# Patient Record
Sex: MALE | Race: WHITE | NOT HISPANIC OR LATINO | URBAN - METROPOLITAN AREA
[De-identification: names, ages, dates, MRNs, and addresses within clinical notes are randomized per-mention and may not be internally consistent; named-entity substitution may affect disease eponyms.]

---

## 2019-01-08 ENCOUNTER — INPATIENT (INPATIENT)
Facility: HOSPITAL | Age: 79
LOS: 2 days | Discharge: HOME | DRG: 312 | End: 2019-01-11
Attending: INTERNAL MEDICINE | Admitting: INTERNAL MEDICINE
Payer: MEDICARE

## 2019-01-08 VITALS
HEART RATE: 66 BPM | TEMPERATURE: 98 F | DIASTOLIC BLOOD PRESSURE: 93 MMHG | RESPIRATION RATE: 18 BRPM | SYSTOLIC BLOOD PRESSURE: 174 MMHG | OXYGEN SATURATION: 100 %

## 2019-01-08 DIAGNOSIS — R55 SYNCOPE AND COLLAPSE: ICD-10-CM

## 2019-01-08 DIAGNOSIS — N18.3 CHRONIC KIDNEY DISEASE, STAGE 3 (MODERATE): ICD-10-CM

## 2019-01-08 DIAGNOSIS — H26.9 UNSPECIFIED CATARACT: Chronic | ICD-10-CM

## 2019-01-08 DIAGNOSIS — I10 ESSENTIAL (PRIMARY) HYPERTENSION: ICD-10-CM

## 2019-01-08 DIAGNOSIS — S01.01XA LACERATION WITHOUT FOREIGN BODY OF SCALP, INITIAL ENCOUNTER: ICD-10-CM

## 2019-01-08 DIAGNOSIS — Z90.79 ACQUIRED ABSENCE OF OTHER GENITAL ORGAN(S): Chronic | ICD-10-CM

## 2019-01-08 DIAGNOSIS — N31.9 NEUROMUSCULAR DYSFUNCTION OF BLADDER, UNSPECIFIED: ICD-10-CM

## 2019-01-08 DIAGNOSIS — E78.5 HYPERLIPIDEMIA, UNSPECIFIED: ICD-10-CM

## 2019-01-08 LAB
ALBUMIN SERPL ELPH-MCNC: 4.2 G/DL — SIGNIFICANT CHANGE UP (ref 3.3–5)
ALP SERPL-CCNC: 79 U/L — SIGNIFICANT CHANGE UP (ref 40–120)
ALT FLD-CCNC: 15 U/L — SIGNIFICANT CHANGE UP (ref 10–45)
ANION GAP SERPL CALC-SCNC: 13 MMOL/L — SIGNIFICANT CHANGE UP (ref 5–17)
APPEARANCE UR: CLEAR — SIGNIFICANT CHANGE UP
APTT BLD: 26.1 SEC — LOW (ref 27.5–36.3)
AST SERPL-CCNC: 19 U/L — SIGNIFICANT CHANGE UP (ref 10–40)
BACTERIA # UR AUTO: PRESENT /HPF
BASOPHILS NFR BLD AUTO: 0.3 % — SIGNIFICANT CHANGE UP (ref 0–2)
BILIRUB SERPL-MCNC: 0.6 MG/DL — SIGNIFICANT CHANGE UP (ref 0.2–1.2)
BILIRUB UR-MCNC: NEGATIVE — SIGNIFICANT CHANGE UP
BUN SERPL-MCNC: 29 MG/DL — HIGH (ref 7–23)
CALCIUM SERPL-MCNC: 9.7 MG/DL — SIGNIFICANT CHANGE UP (ref 8.4–10.5)
CHLORIDE SERPL-SCNC: 107 MMOL/L — SIGNIFICANT CHANGE UP (ref 96–108)
CK MB CFR SERPL CALC: 2.7 NG/ML — SIGNIFICANT CHANGE UP (ref 0–6.7)
CK MB CFR SERPL CALC: 2.9 NG/ML — SIGNIFICANT CHANGE UP (ref 0–6.7)
CK MB CFR SERPL CALC: 2.9 NG/ML — SIGNIFICANT CHANGE UP (ref 0–6.7)
CK SERPL-CCNC: 145 U/L — SIGNIFICANT CHANGE UP (ref 30–200)
CK SERPL-CCNC: 153 U/L — SIGNIFICANT CHANGE UP (ref 30–200)
CK SERPL-CCNC: 166 U/L — SIGNIFICANT CHANGE UP (ref 30–200)
CO2 SERPL-SCNC: 24 MMOL/L — SIGNIFICANT CHANGE UP (ref 22–31)
COLOR SPEC: YELLOW — SIGNIFICANT CHANGE UP
COMMENT - URINE: SIGNIFICANT CHANGE UP
CREAT SERPL-MCNC: 1.64 MG/DL — HIGH (ref 0.5–1.3)
DIFF PNL FLD: NEGATIVE — SIGNIFICANT CHANGE UP
EOSINOPHIL NFR BLD AUTO: 0.7 % — SIGNIFICANT CHANGE UP (ref 0–6)
EPI CELLS # UR: ABNORMAL /HPF (ref 0–5)
GLUCOSE SERPL-MCNC: 91 MG/DL — SIGNIFICANT CHANGE UP (ref 70–99)
GLUCOSE UR QL: NEGATIVE — SIGNIFICANT CHANGE UP
HBV SURFACE AG SER-ACNC: SIGNIFICANT CHANGE UP
HCT VFR BLD CALC: 38 % — LOW (ref 39–50)
HCV AB S/CO SERPL IA: 0.05 S/CO — SIGNIFICANT CHANGE UP
HCV AB SERPL-IMP: SIGNIFICANT CHANGE UP
HGB BLD-MCNC: 12.9 G/DL — LOW (ref 13–17)
HIV 1+2 AB+HIV1 P24 AG SERPL QL IA: SIGNIFICANT CHANGE UP
INR BLD: 1.04 — SIGNIFICANT CHANGE UP (ref 0.88–1.16)
KETONES UR-MCNC: NEGATIVE — SIGNIFICANT CHANGE UP
LEUKOCYTE ESTERASE UR-ACNC: ABNORMAL
LYMPHOCYTES # BLD AUTO: 12.6 % — LOW (ref 13–44)
MCHC RBC-ENTMCNC: 29.9 PG — SIGNIFICANT CHANGE UP (ref 27–34)
MCHC RBC-ENTMCNC: 33.9 G/DL — SIGNIFICANT CHANGE UP (ref 32–36)
MCV RBC AUTO: 88 FL — SIGNIFICANT CHANGE UP (ref 80–100)
MONOCYTES NFR BLD AUTO: 7.6 % — SIGNIFICANT CHANGE UP (ref 2–14)
NEUTROPHILS NFR BLD AUTO: 78.8 % — HIGH (ref 43–77)
NITRITE UR-MCNC: POSITIVE
PH UR: 7 — SIGNIFICANT CHANGE UP (ref 5–8)
PLATELET # BLD AUTO: 142 K/UL — LOW (ref 150–400)
POTASSIUM SERPL-MCNC: 4.3 MMOL/L — SIGNIFICANT CHANGE UP (ref 3.5–5.3)
POTASSIUM SERPL-SCNC: 4.3 MMOL/L — SIGNIFICANT CHANGE UP (ref 3.5–5.3)
PROT SERPL-MCNC: 6.9 G/DL — SIGNIFICANT CHANGE UP (ref 6–8.3)
PROT UR-MCNC: NEGATIVE MG/DL — SIGNIFICANT CHANGE UP
PROTHROM AB SERPL-ACNC: 11.8 SEC — SIGNIFICANT CHANGE UP (ref 10–12.9)
RBC # BLD: 4.32 M/UL — SIGNIFICANT CHANGE UP (ref 4.2–5.8)
RBC # FLD: 12.6 % — SIGNIFICANT CHANGE UP (ref 10.3–16.9)
RBC CASTS # UR COMP ASSIST: < 5 /HPF — SIGNIFICANT CHANGE UP
SODIUM SERPL-SCNC: 144 MMOL/L — SIGNIFICANT CHANGE UP (ref 135–145)
SP GR SPEC: 1.01 — SIGNIFICANT CHANGE UP (ref 1–1.03)
TROPONIN T SERPL-MCNC: <0.01 NG/ML — SIGNIFICANT CHANGE UP (ref 0–0.01)
UROBILINOGEN FLD QL: 0.2 E.U./DL — SIGNIFICANT CHANGE UP
WBC # BLD: 7.2 K/UL — SIGNIFICANT CHANGE UP (ref 3.8–10.5)
WBC # FLD AUTO: 7.2 K/UL — SIGNIFICANT CHANGE UP (ref 3.8–10.5)
WBC UR QL: < 5 /HPF — SIGNIFICANT CHANGE UP

## 2019-01-08 PROCEDURE — 93880 EXTRACRANIAL BILAT STUDY: CPT | Mod: 26

## 2019-01-08 PROCEDURE — 71045 X-RAY EXAM CHEST 1 VIEW: CPT | Mod: 26

## 2019-01-08 PROCEDURE — 72100 X-RAY EXAM L-S SPINE 2/3 VWS: CPT | Mod: 26

## 2019-01-08 PROCEDURE — 93010 ELECTROCARDIOGRAM REPORT: CPT

## 2019-01-08 PROCEDURE — 99222 1ST HOSP IP/OBS MODERATE 55: CPT

## 2019-01-08 PROCEDURE — 72220 X-RAY EXAM SACRUM TAILBONE: CPT | Mod: 26

## 2019-01-08 PROCEDURE — 72170 X-RAY EXAM OF PELVIS: CPT | Mod: 26

## 2019-01-08 PROCEDURE — 70450 CT HEAD/BRAIN W/O DYE: CPT | Mod: 26

## 2019-01-08 PROCEDURE — 99285 EMERGENCY DEPT VISIT HI MDM: CPT | Mod: 25

## 2019-01-08 RX ORDER — TETANUS TOXOID, REDUCED DIPHTHERIA TOXOID AND ACELLULAR PERTUSSIS VACCINE, ADSORBED 5; 2.5; 8; 8; 2.5 [IU]/.5ML; [IU]/.5ML; UG/.5ML; UG/.5ML; UG/.5ML
0.5 SUSPENSION INTRAMUSCULAR ONCE
Qty: 0 | Refills: 0 | Status: COMPLETED | OUTPATIENT
Start: 2019-01-08 | End: 2019-01-08

## 2019-01-08 RX ORDER — ASCORBIC ACID 60 MG
3 TABLET,CHEWABLE ORAL
Qty: 0 | Refills: 0 | COMMUNITY

## 2019-01-08 RX ORDER — PANTOPRAZOLE SODIUM 20 MG/1
0 TABLET, DELAYED RELEASE ORAL
Qty: 0 | Refills: 0 | COMMUNITY

## 2019-01-08 RX ORDER — ASPIRIN/CALCIUM CARB/MAGNESIUM 324 MG
1 TABLET ORAL
Qty: 0 | Refills: 0 | COMMUNITY

## 2019-01-08 RX ORDER — BACITRACIN ZINC 500 UNIT/G
1 OINTMENT IN PACKET (EA) TOPICAL THREE TIMES A DAY
Qty: 0 | Refills: 0 | Status: DISCONTINUED | OUTPATIENT
Start: 2019-01-08 | End: 2019-01-11

## 2019-01-08 RX ORDER — PANTOPRAZOLE SODIUM 20 MG/1
1 TABLET, DELAYED RELEASE ORAL
Qty: 0 | Refills: 0 | COMMUNITY

## 2019-01-08 RX ORDER — ERGOCALCIFEROL 1.25 MG/1
1 CAPSULE ORAL
Qty: 0 | Refills: 0 | COMMUNITY

## 2019-01-08 RX ADMIN — TETANUS TOXOID, REDUCED DIPHTHERIA TOXOID AND ACELLULAR PERTUSSIS VACCINE, ADSORBED 0.5 MILLILITER(S): 5; 2.5; 8; 8; 2.5 SUSPENSION INTRAMUSCULAR at 12:30

## 2019-01-08 RX ADMIN — Medication 1 APPLICATION(S): at 23:29

## 2019-01-08 NOTE — H&P ADULT - NSHPLABSRESULTS_GEN_ALL_CORE
12.9   7.2   )-----------( 142      ( 2019 10:34 )             38.0       01-08    144  |  107  |  29<H>  ----------------------------<  91  4.3   |  24  |  1.64<H>    Ca    9.7      2019 10:34  Phos  2.5     01-  Mg     2.1     -08    TPro  6.9  /  Alb  4.2  /  TBili  0.6  /  DBili  x   /  AST  19  /  ALT  15  /  AlkPhos  79  01-08  PT/INR - ( 2019 10:34 )   PT: 11.8 sec;   INR: 1.04     PTT - ( 2019 10:34 )  PTT:26.1 sec  CARDIAC MARKERS ( 2019 10:34 )  x     / <0.01 ng/mL / 145 U/L / x     / 2.9 ng/mL    Urinalysis Basic - ( 2019 15:41 )    Color: Yellow / Appearance: Clear / S.010 / pH: x  Gluc: x / Ketone: NEGATIVE  / Bili: Negative / Urobili: 0.2 E.U./dL   Blood: x / Protein: NEGATIVE mg/dL / Nitrite: POSITIVE   Leuk Esterase: Trace / RBC: < 5 /HPF / WBC < 5 /HPF   Sq Epi: x / Non Sq Epi: 5-10 /HPF / Bacteria: Present /HPF        EKG: SB without ST/T segment changes

## 2019-01-08 NOTE — ED ADULT NURSE NOTE - NSIMPLEMENTINTERV_GEN_ALL_ED
Implemented All Fall Risk Interventions:  [place] to call system. Call bell, personal items and telephone within reach. Instruct patient to call for assistance. Room bathroom lighting operational. Non-slip footwear when patient is off stretcher. Physically safe environment: no spills, clutter or unnecessary equipment. Stretcher in lowest position, wheels locked, appropriate side rails in place. Provide visual cue, wrist band, yellow gown, etc. Monitor gait and stability. Monitor for mental status changes and reorient to person, place, and time. Review medications for side effects contributing to fall risk. Reinforce activity limits and safety measures with patient and family.

## 2019-01-08 NOTE — H&P ADULT - PROBLEM SELECTOR PLAN 3
BUN/Cr:  29/1.64  - Avoid nephrotoxic agents, NSAIDs  - Continue to straight cath as per pt's schedule

## 2019-01-08 NOTE — ED PROVIDER NOTE - SKIN, MLM
Skin normal color for race, warm, dry and intact. No evidence of rash., 2 cm laceration on back of head.

## 2019-01-08 NOTE — H&P ADULT - PROBLEM SELECTOR PLAN 6
- No stitches requires  - c/w bacitracin    Dispo:  - PT eval - No stitches requires  - c/w bacitracin TID    Dispo:  - PT eval    Case d/w JONNA Estrada

## 2019-01-08 NOTE — ED PROVIDER NOTE - MEDICAL DECISION MAKING DETAILS
79 yo with unexplained syncope and resulting head trauma, and mild low back discomfort D Dx, dysrhythmia , metabolic dysfunction, dehydration, ICH, infection, neurogenic syncope, Plan : head CT, ekg sinus now, Labs , xray chest pelvis L spine, Plan to admit for cardiac telemetry.

## 2019-01-08 NOTE — ED PROVIDER NOTE - CARE PLAN
Principal Discharge DX:	Syncope Principal Discharge DX:	Syncope  Secondary Diagnosis:	Scalp laceration

## 2019-01-08 NOTE — ED PROVIDER NOTE - PMH
Arthritis    Enlarged prostate    Essential hypertension    HTN (hypertension)    Hyperlipidemia, unspecified hyperlipidemia type    Neurogenic bladder    Prostate CA

## 2019-01-08 NOTE — ED PROVIDER NOTE - CHIEF COMPLAINT
The patient is a 78y Male complaining of syncope. normal (ped)... In no apparent distress, appears well developed and well nourished.

## 2019-01-08 NOTE — ED PROVIDER NOTE - OBJECTIVE STATEMENT
77 yo with PMH of HTN, HLD, neurogenic bladder and prostate CA,   presents with syncopal episode, reports he was making oatmeal and felt lightheaded and dizzy, + fall, thinks he lowered himself , + hit head, + LOC, denies loss of urine, noticed when he got up he laid down on bed and saw blood on pillowcase,  decreased hearing in L ear. sustaines laceration on head, minimal headache at location of laceration, no vomiting, no CP , no SOB, no palpitations, no vertigo, now feels ok w exception of minimal scalp pain.    mild lower back pain. 77 yo with PMH of HTN, HLD, neurogenic bladder and prostate CA,   presents with syncopal episode, reports he was making oatmeal and felt lightheaded and dizzy, + fall, thinks he lowered himself , + hit head, + LOC, denies loss of urine, noticed when he got up he laid down on bed and saw blood on pillowcase,  decreased hearing in L ear. sustains laceration on head, minimal headache at location of laceration, no vomiting, no CP , no SOB, no palpitations, no vertigo, now feels ok w exception of minimal scalp pain.    mild lower back pain.

## 2019-01-08 NOTE — H&P ADULT - HISTORY OF PRESENT ILLNESS
**SKELETON    79 yo male with PMHx of HTN, HLD, neurogenic bladder, prostate CA who presented to [facility] ED with unwitnessed syncopal episode. Pt states he was making oatmeal, felt lightheaded/dizzy, thinks he lowered himself to ground and then woke up on the ground. Pt endorses head strike and decreased hearing in L ear.     Denies HA, vision changes, numbness/tingling, amaurosis fugax, prior CVA/TIA, extremity weakness, gait disturbance, urinary/fecal incontinence, CP, SOB, palpitations, diaphoresis, fatigue, LE edema, orthopnea, PND, prior syncopal episodes, N/V, abdominal pain.       Upon admit, VS: /93, HR 66 BPM, T: 98F, RR 18, SpO2 100% on RA.  Labs significant for BUN 29, Cr 1.64, H/H 12.9/38, neutrophils 78.8%, trop neg x1, UA: nitrite present, trace LE, bacteria present, epithelial cells present. ECG: SB @ 58 BPM with 1st degree AV block without ST/T segment changes. CXR revealed: Lungs clear with no infiltrate or pleural effusion. XR pelvis: Surgical clips lower pelvis, no bony abnormality, no fracture. XR lumbar spine: No fracture, mild levoscoliosis, lumbar spondylosis as described above. CT head: Right parietal scalp soft tissue swelling with subcutaneous emphysema which may be secondary to underlying laceration injury, no ICH or calvarial fracture.  Pt admitted to 5U for telemetry, ECHO, ?neuro consult. **SKELETON    77 yo male with PMHx of HTN, HLD, neurogenic bladder, prostate CA who presented to [facility] ED with unwitnessed syncopal episode. Pt states he was making oatmeal, felt lightheaded/dizzy, thinks he lowered himself to ground and then woke up on the ground. Pt endorses head strike and decreased hearing in L ear.     Denies HA, vision changes, numbness/tingling, amaurosis fugax, prior CVA/TIA, extremity weakness, gait disturbance, urinary/fecal incontinence, CP, SOB, palpitations, diaphoresis, fatigue, LE edema, orthopnea, PND, prior syncopal episodes, N/V, abdominal pain.       Upon admit, VS: /93, HR 66 BPM, T: 98F, RR 18, SpO2 100% on RA.  Labs significant for BUN 29, Cr 1.64, H/H 12.9/38, neutrophils 78.8%, trop neg x1, UA: nitrite present, trace LE, bacteria present, epithelial cells present. ECG: SB @ 58 BPM with 1st degree AV block without ST/T segment changes. CXR revealed: Lungs clear with no infiltrate or pleural effusion. XR pelvis: Surgical clips lower pelvis, no bony abnormality, no fracture. XR lumbar spine: No fracture, mild levoscoliosis, lumbar spondylosis as described above. CT head: Right parietal scalp soft tissue swelling with subcutaneous emphysema which may be secondary to underlying laceration injury, no ICH or calvarial fracture.  While in ED, pt received dtap vaccine. Pt admitted to 5U for telemetry, ECHO, ?neuro consult. 77 yo male with PMHx of HTN, HLD, CKD stage III, neurogenic bladder (dx in 2017, self catheterizes 8AM, 4PM, 12 AM), prostate CA (dx in 1999, s/p XRT and currently undergoing monthly Lupron injections), BPH s/p TURP in 1999, GERD who presented to [facility] ED with unwitnessed syncopal episode. Pt states he was making oatmeal, felt lightheaded/dizzy with generalized weakness and then woke up on the ground. Pt endorses head strike and decreased hearing in B/L ears after event (L>R) which has since resolved.  Of note, pt endorses feeling of generalized weakness and intermittent dizziness when he lifts his arms over his head over past 2 years. Pt also reports increased stress recently as his girlfriend is on hospice care and his friend was recently dx with Parkinson's and kidney failure. Denies HA currently, vision changes, numbness/tingling, amaurosis fugax, prior CVA/TIA, extremity weakness, gait disturbance, urinary/fecal incontinence, CP, SOB, palpitations, diaphoresis, fatigue, LE edema, orthopnea, PND, prior syncopal episodes, N/V, abdominal pain.  Upon admit, VS: /93, HR 66 BPM, T: 98F, RR 18, SpO2 100% on RA.  Labs significant for BUN 29, Cr 1.64, H/H 12.9/38, neutrophils 78.8%, trop neg x1, UA: nitrite present, trace LE, bacteria present, epithelial cells present. ECG: SB @ 58 BPM with 1st degree AV block without ST/T segment changes. CXR revealed: Lungs clear with no infiltrate or pleural effusion. XR pelvis: Surgical clips lower pelvis, no bony abnormality, no fracture. XR lumbar spine: No fracture, mild levoscoliosis, lumbar spondylosis as described above. CT head: Right parietal scalp soft tissue swelling with subcutaneous emphysema which may be secondary to underlying laceration injury, no ICH or calvarial fracture. While in ED, pt received dtap vaccine. Pt admitted to 5U for telemetry, ECHO, ?neuro consult.

## 2019-01-08 NOTE — ED ADULT NURSE NOTE - CHPI ED NUR SYMPTOMS NEG
no chest pain/no diaphoresis/no back pain/no fever/no nausea/no dizziness/no shortness of breath/no chills/no congestion/no vomiting

## 2019-01-08 NOTE — H&P ADULT - ASSESSMENT
79 yo male with PMHx of HTN, HLD, CKD stage III, neurogenic bladder (dx in 2017, self catheterizes 8AM, 4PM, 12 AM), prostate CA (dx in 1999, s/p XRT and currently undergoing monthly Lupron injections), BPH s/p TURP in 1999, GERD who presented to [facility] ED with unwitnessed syncopal episode. Pt states he was making oatmeal, felt lightheaded/dizzy with generalized weakness and then woke up on the ground. Pt endorses head strike and decreased hearing in B/L ears after event (L>R) which has since resolved.  Of note, pt endorses feeling of generalized weakness and intermittent dizziness when he lifts his arms over his head over past 2 years. Pt also reports increased stress recently as his girlfriend is on hospice care and his friend was recently dx with Parkinson's and kidney failure. Denies HA currently, vision changes, numbness/tingling, amaurosis fugax, prior CVA/TIA, extremity weakness, gait disturbance, urinary/fecal incontinence, CP, SOB, palpitations, diaphoresis, fatigue, LE edema, orthopnea, PND, prior syncopal episodes, N/V, abdominal pain.  Upon admit, VS: /93, HR 66 BPM, T: 98F, RR 18, SpO2 100% on RA.  Labs significant for BUN 29, Cr 1.64, H/H 12.9/38, neutrophils 78.8%, trop neg x1, UA: nitrite present, trace LE, bacteria present, epithelial cells present. ECG: SB @ 58 BPM with 1st degree AV block without ST/T segment changes. CXR revealed: Lungs clear with no infiltrate or pleural effusion. XR pelvis: Surgical clips lower pelvis, no bony abnormality, no fracture. XR lumbar spine: No fracture, mild levoscoliosis, lumbar spondylosis as described above. CT head: Right parietal scalp soft tissue swelling with subcutaneous emphysema which may be secondary to underlying laceration injury, no ICH or calvarial fracture. While in ED, pt received dtap vaccine. Pt admitted to 5U for telemetry, ECHO, ?neuro consult. 77 yo male with PMHx of HTN, HLD, CKD stage III, neurogenic bladder (dx in 2017, self catheterizes 8AM, 4PM, 12 AM), prostate CA (dx in 1999, s/p XRT and currently undergoing monthly Lupron injections), BPH s/p TURP in 1999, GERD who presented to [facility] ED with unwitnessed syncopal episode. Pt states he was making oatmeal, felt lightheaded/dizzy with generalized weakness and then woke up on the ground. Pt endorses head strike and decreased hearing in B/L ears after event (L>R) which has since resolved.  Of note, pt endorses feeling of generalized weakness and intermittent dizziness when he lifts his arms over his head over past 2 years.   Pt admitted to 5U for telemetry, ECHO, carotid doppler, ?neuro consult.

## 2019-01-08 NOTE — ED PROVIDER NOTE - NEUROLOGICAL, MLM
Alert and oriented, no focal deficits, no motor or sensory deficits. CN'II-XII intact, no pronator drift, nl finger to nose, clear speech, gait intact

## 2019-01-08 NOTE — ED ADULT NURSE NOTE - OBJECTIVE STATEMENT
Pt a&ox3 BIBA c/o syncopal event. Pt passed out while making breakfast and hit his head on the floor. unknown LOC. small laceration noted to back of head, no bleeding at this time. c/o dizziness before passing out. Had a biopsy done yesterday for melanoma. Denies CP, n/v, fever, chills, cough. Denies headache, change in vision,. Symmetrical smile. no pronator arm drift. FUL rom in upper and lower extremities. Speaking in full sentence, symmetrical chest rise Placed on CCM, NSR.

## 2019-01-08 NOTE — H&P ADULT - FAMILY HISTORY
Mother  Still living? Unknown  Family history of cerebrovascular accident (CVA), Age at diagnosis: 71-80

## 2019-01-08 NOTE — H&P ADULT - PSH
Cataract  s/p L catact sx 1.5 years ago  S/P TURP (status post transurethral resection of prostate)  1999

## 2019-01-08 NOTE — ED PROVIDER NOTE - MUSCULOSKELETAL, MLM
Spine appears normal, range of motion is not limited, no muscle or joint tenderness, minimal L spine tenderness, no T or C spine tender

## 2019-01-08 NOTE — H&P ADULT - PROBLEM SELECTOR PLAN 1
- ECG: SB @ 58 BPM with 1st degree AV block without ST/T segment changes.   - CXR revealed: Lungs clear with no infiltrate or pleural effusion.   - CT head: Right parietal scalp soft tissue swelling with subcutaneous emphysema which may be secondary to underlying laceration injury, no ICH or calvarial fracture.  - f/u ECHO   - f/u orthostatics  - UA dirty, f/u UCx  - Continuous telemetry Chest pain free and hemodynamically stable  - Trop neg x2, f/u 8pm, AM CE  - ECG: SB @ 58 BPM with 1st degree AV block without ST/T segment changes.   - CXR revealed: Lungs clear with no infiltrate or pleural effusion.   - CT head: Right parietal scalp soft tissue swelling with subcutaneous emphysema which may be secondary to underlying laceration injury, no ICH or calvarial fracture.  - XR lumbar spine: No fracture, mild levoscoliosis, lumbar spondylosis as described above. f/u XR sacrum/coccyx  - f/u ECHO   - f/u orthostatics  - Carotid Duplex to assess for possible subclavian steel/vertebrobasilar insufficiency  - UA dirty, f/u UCx  - Discuss with Dr. Estrada re: possible neuro cs in AM  - Continuous telemetry Chest pain free and hemodynamically stable  - Trop neg x2, f/u 8pm, AM CE  - ECG: SB @ 58 BPM with 1st degree AV block without ST/T segment changes.   - CXR revealed: Lungs clear with no infiltrate or pleural effusion.   - CT head: Right parietal scalp soft tissue swelling with subcutaneous emphysema which may be secondary to underlying laceration injury, no ICH or calvarial fracture.  - XR lumbar spine: No fracture, mild levoscoliosis, lumbar spondylosis as described above. f/u XR sacrum/coccyx  - f/u ECHO   - f/u orthostatics  - Carotid Duplex to assess for possible subclavian steel/vertebrobasilar insufficiency  - UA dirty, f/u UCx  - Denies any history of ischemic w/u  - Discuss with Dr. Estrada re: possible neuro cs in AM  - Continuous telemetry

## 2019-01-08 NOTE — H&P ADULT - NSHPSOCIALHISTORY_GEN_ALL_CORE
Denies tobacco use, alcohol use, elicit drug use. Denies tobacco use, elicit drug use.  Drinks 1 glass of alcohol per week.

## 2019-01-08 NOTE — ED ADULT NURSE REASSESSMENT NOTE - NS ED NURSE REASSESS COMMENT FT1
unable to give urine. Is straight catheterized at home for enlarged prostate at 8 am, 4pm, 12 am. was straight cath this am.

## 2019-01-09 LAB
ALBUMIN SERPL ELPH-MCNC: 3.8 G/DL — SIGNIFICANT CHANGE UP (ref 3.3–5)
ALP SERPL-CCNC: 75 U/L — SIGNIFICANT CHANGE UP (ref 40–120)
ALT FLD-CCNC: 13 U/L — SIGNIFICANT CHANGE UP (ref 10–45)
ANION GAP SERPL CALC-SCNC: 23 MMOL/L — HIGH (ref 5–17)
AST SERPL-CCNC: 19 U/L — SIGNIFICANT CHANGE UP (ref 10–40)
BILIRUB SERPL-MCNC: 0.8 MG/DL — SIGNIFICANT CHANGE UP (ref 0.2–1.2)
BUN SERPL-MCNC: 25 MG/DL — HIGH (ref 7–23)
CALCIUM SERPL-MCNC: 9.1 MG/DL — SIGNIFICANT CHANGE UP (ref 8.4–10.5)
CHLORIDE SERPL-SCNC: 97 MMOL/L — SIGNIFICANT CHANGE UP (ref 96–108)
CHOLEST SERPL-MCNC: 201 MG/DL — HIGH (ref 10–199)
CK MB CFR SERPL CALC: 3.1 NG/ML — SIGNIFICANT CHANGE UP (ref 0–6.7)
CK SERPL-CCNC: 176 U/L — SIGNIFICANT CHANGE UP (ref 30–200)
CO2 SERPL-SCNC: 19 MMOL/L — LOW (ref 22–31)
CREAT SERPL-MCNC: 1.39 MG/DL — HIGH (ref 0.5–1.3)
FOLATE SERPL-MCNC: >20 NG/ML — SIGNIFICANT CHANGE UP
GLUCOSE SERPL-MCNC: 116 MG/DL — HIGH (ref 70–99)
HAV IGM SER-ACNC: SIGNIFICANT CHANGE UP
HBA1C BLD-MCNC: 5.2 % — SIGNIFICANT CHANGE UP (ref 4–5.6)
HBV CORE IGM SER-ACNC: SIGNIFICANT CHANGE UP
HCT VFR BLD CALC: 37.5 % — LOW (ref 39–50)
HDLC SERPL-MCNC: 64 MG/DL — SIGNIFICANT CHANGE UP
HGB BLD-MCNC: 12.6 G/DL — LOW (ref 13–17)
LIPID PNL WITH DIRECT LDL SERPL: 118 MG/DL — SIGNIFICANT CHANGE UP
MAGNESIUM SERPL-MCNC: 2 MG/DL — SIGNIFICANT CHANGE UP (ref 1.6–2.6)
MCHC RBC-ENTMCNC: 29.7 PG — SIGNIFICANT CHANGE UP (ref 27–34)
MCHC RBC-ENTMCNC: 33.6 G/DL — SIGNIFICANT CHANGE UP (ref 32–36)
MCV RBC AUTO: 88.4 FL — SIGNIFICANT CHANGE UP (ref 80–100)
PLATELET # BLD AUTO: 121 K/UL — LOW (ref 150–400)
POTASSIUM SERPL-MCNC: 3.8 MMOL/L — SIGNIFICANT CHANGE UP (ref 3.5–5.3)
POTASSIUM SERPL-SCNC: 3.8 MMOL/L — SIGNIFICANT CHANGE UP (ref 3.5–5.3)
PROT SERPL-MCNC: 6.4 G/DL — SIGNIFICANT CHANGE UP (ref 6–8.3)
RBC # BLD: 4.24 M/UL — SIGNIFICANT CHANGE UP (ref 4.2–5.8)
RBC # FLD: 12.5 % — SIGNIFICANT CHANGE UP (ref 10.3–16.9)
SODIUM SERPL-SCNC: 139 MMOL/L — SIGNIFICANT CHANGE UP (ref 135–145)
T PALLIDUM AB TITR SER: NEGATIVE — SIGNIFICANT CHANGE UP
TOTAL CHOLESTEROL/HDL RATIO MEASUREMENT: 3.1 RATIO — LOW (ref 3.4–9.6)
TRIGL SERPL-MCNC: 97 MG/DL — SIGNIFICANT CHANGE UP (ref 10–149)
TROPONIN T SERPL-MCNC: <0.01 NG/ML — SIGNIFICANT CHANGE UP (ref 0–0.01)
TSH SERPL-MCNC: 1.2 UIU/ML — SIGNIFICANT CHANGE UP (ref 0.35–4.94)
VIT B12 SERPL-MCNC: 624 PG/ML — SIGNIFICANT CHANGE UP (ref 232–1245)
WBC # BLD: 8 K/UL — SIGNIFICANT CHANGE UP (ref 3.8–10.5)
WBC # FLD AUTO: 8 K/UL — SIGNIFICANT CHANGE UP (ref 3.8–10.5)

## 2019-01-09 PROCEDURE — 99233 SBSQ HOSP IP/OBS HIGH 50: CPT

## 2019-01-09 RX ORDER — LANOLIN ALCOHOL/MO/W.PET/CERES
3 CREAM (GRAM) TOPICAL AT BEDTIME
Qty: 0 | Refills: 0 | Status: DISCONTINUED | OUTPATIENT
Start: 2019-01-09 | End: 2019-01-11

## 2019-01-09 RX ORDER — PANTOPRAZOLE SODIUM 20 MG/1
40 TABLET, DELAYED RELEASE ORAL
Qty: 0 | Refills: 0 | Status: DISCONTINUED | OUTPATIENT
Start: 2019-01-09 | End: 2019-01-11

## 2019-01-09 RX ORDER — AMLODIPINE BESYLATE 2.5 MG/1
5 TABLET ORAL ONCE
Qty: 0 | Refills: 0 | Status: COMPLETED | OUTPATIENT
Start: 2019-01-09 | End: 2019-01-09

## 2019-01-09 RX ORDER — ONDANSETRON 8 MG/1
4 TABLET, FILM COATED ORAL ONCE
Qty: 0 | Refills: 0 | Status: COMPLETED | OUTPATIENT
Start: 2019-01-09 | End: 2019-01-09

## 2019-01-09 RX ADMIN — AMLODIPINE BESYLATE 5 MILLIGRAM(S): 2.5 TABLET ORAL at 23:08

## 2019-01-09 RX ADMIN — Medication 1 APPLICATION(S): at 06:09

## 2019-01-09 RX ADMIN — Medication 1 APPLICATION(S): at 16:31

## 2019-01-09 RX ADMIN — PANTOPRAZOLE SODIUM 40 MILLIGRAM(S): 20 TABLET, DELAYED RELEASE ORAL at 07:16

## 2019-01-09 RX ADMIN — ONDANSETRON 4 MILLIGRAM(S): 8 TABLET, FILM COATED ORAL at 19:55

## 2019-01-09 NOTE — PROGRESS NOTE ADULT - PROBLEM SELECTOR PLAN 1
-Chest pain free and hemodynamically stable  - Trop neg x3  - ECG: SB @ 58 BPM with 1st degree AV block without ST/T segment changes.   - f/u ECHO   - Orthostatic negative. BP equal in b/l arms  - Carotid Duplex 01/09: no carotid stenosis, anterograde flow within both vertebral arteries  - UA dirty, f/u UCx  - Denies any history of ischemic w/u  - Continuous telemetry.

## 2019-01-09 NOTE — PHYSICAL THERAPY INITIAL EVALUATION ADULT - GENERAL OBSERVATIONS, REHAB EVAL
Chart reviewed, IE completed. Admitted s/p fall likely 2/2 syncope. Pt rcvd supine, +tele, needing to use bathroom. Pt tolerated eval well, demo supervision bed mob, indpt transfers, amb 300ft indpt with good stability. Demo indpt toileting without LOB, able to pick objects up off ground. Pt demo mildly decr dyanmic standing balance and LE strength. D/c from inpatient PT at this time, appropriate for OP PT follow up. FIM gait=7

## 2019-01-09 NOTE — PROGRESS NOTE ADULT - PROBLEM SELECTOR PLAN 6
- No stitches required  - c/w bacitracin TID    DVT PPx: SCDs  Dispo: PT eval, f/u echo    Case d/w Dr. Sprague

## 2019-01-09 NOTE — PROGRESS NOTE ADULT - PROBLEM SELECTOR PLAN 3
-BUN/Cr:  25/1.39 this AM  - Avoid nephrotoxic agents, NSAIDs  - Continue to straight cath as per pt's schedule.   -monitor electrolytes

## 2019-01-09 NOTE — PHYSICAL THERAPY INITIAL EVALUATION ADULT - PERTINENT HX OF CURRENT PROBLEM, REHAB EVAL
77 yo male with PMHx of HTN, HLD, CKD stage III, neurogenic bladder (dx in 2017, self catheterizes 8AM, 4PM, 12 AM), prostate CA (dx in 1999, s/p XRT and currently undergoing monthly Lupron injections), BPH s/p TURP in 1999, GERD who presented to [facility] ED with unwitnessed syncopal episode.

## 2019-01-09 NOTE — PHYSICAL THERAPY INITIAL EVALUATION ADULT - ADDITIONAL COMMENTS
Pt lives in elevator apartment without stairs, alone. Pt reports at baseline he walks 1 to 1.5 miles a day, idpt with all ADLs/iADLs. Pt denies device use, help in home. H/o of 1 fall 2/2 tripping, and fall causing this admission.

## 2019-01-09 NOTE — PROGRESS NOTE ADULT - ASSESSMENT
79 yo male with PMHx of HTN, HLD, CKD stage III, neurogenic bladder (dx in 2017, self catheterizes 8AM, 4PM, 12 AM), prostate CA (dx in 1999, s/p XRT and currently undergoing monthly Lupron injections), BPH s/p TURP in 1999, GERD who presented to [facility] ED with unwitnessed syncopal episode. Pt states he was making oatmeal, felt lightheaded/dizzy with generalized weakness and then woke up on the ground. Pt endorses head strike and decreased hearing in B/L ears after event (L>R) which has since resolved.  Of note, pt endorses feeling of generalized weakness and intermittent dizziness when he lifts his arms over his head over past 2 years.   Pt admitted to 5U for telemetry, ECHO, carotid doppler, ?neuro consult. 79 yo male with PMHx of HTN, HLD, CKD stage III, neurogenic bladder (dx in 2017, self catheterizes 8AM, 4PM, 12 AM), prostate CA (dx in 1999, s/p XRT and currently undergoing monthly Lupron injections), BPH s/p TURP in 1999, GERD who presented to [facility] ED with unwitnessed syncopal episode. Pt admitted to 5U for telemetry, ECHO, carotid Doppler.

## 2019-01-09 NOTE — PROGRESS NOTE ADULT - ATTENDING COMMENTS
Reviewed above documentation. Reviewed vitals, labs, medications, cardiac studies and additional imaging 1/9/19. Agree with the above with the following additions/amendments:  78M with Essential HTN, HLD, CKD stage III, neurogenic bladder (dx in 2017, self catheterizes 8AM, 4PM, 12 AM), prostate CA (dx in 1999, s/p XRT on monthly Lupron injections), BPH s/p TURP in 1999, who presents for unwitnessed syncope with prodrome.  Patient also reporting dizziness when arms lifted over his head. Pt admitted for work up.  Evaluation of potential subclavian stenosis with vascular ultrasound of UE vessels  Order TTE for structural assessment  Perform orthostatic vitals  Perform bilateral UE blood pressure check  Telemetry monitoring  If above work up unrevealing, will d/w EP setting patient up with long term cardiac monitoring  ANJEL Abreu.  Cardiology Attending

## 2019-01-09 NOTE — PROGRESS NOTE ADULT - SUBJECTIVE AND OBJECTIVE BOX
Interventional Cardiology PA Adult Progress Note    Subjective Assessment:  	  MEDICATIONS:        melatonin 3 milliGRAM(s) Oral at bedtime  ondansetron Injectable 4 milliGRAM(s) IV Push once    pantoprazole    Tablet 40 milliGRAM(s) Oral before breakfast      BACItracin   Ointment 1 Application(s) Topical three times a day      	    [PHYSICAL EXAM:  TELEMETRY:  T(C): 36.7 (01-09-19 @ 09:31), Max: 37.1 (01-08-19 @ 16:57)  HR: 56 (01-09-19 @ 09:31) (56 - 88)  BP: 115/74 (01-09-19 @ 10:56) (114/71 - 181/93)  RR: 16 (01-09-19 @ 09:31) (16 - 18)  SpO2: 96% (01-09-19 @ 09:31) (96% - 99%)  Wt(kg): --  I&O's Summary    08 Jan 2019 07:01  -  09 Jan 2019 07:00  --------------------------------------------------------  IN: 0 mL / OUT: 1200 mL / NET: -1200 mL        Rodríguez:  Central/PICC/Mid Line:                                         Appearance: Normal	  HEENT:   Normal oral mucosa, PERRL, EOMI	  Neck: Supple, + JVD/ - JVD; Carotid Bruit   Cardiovascular: Normal S1 S2, No JVD, No murmurs,   Respiratory: Lungs clear to auscultation/Decreased Breath Sounds/No Rales, Rhonchi, Wheezing	  Gastrointestinal:  Soft, Non-tender, + BS	  Skin: No rashes, No ecchymoses, No cyanosis  Extremities: Normal range of motion, No clubbing, cyanosis or edema  Vascular: Peripheral pulses palpable 2+ bilaterally  Neurologic: Non-focal  Psychiatry: A & O x 3, Mood & affect appropriate      	    ECG:  	  RADIOLOGY:   DIAGNOSTIC TESTING:  [ ] Echocardiogram:  [ ]  Catheterization:  [ ] Stress Test:    [ ] ANDREW  OTHER: 	    LABS:	 	  CARDIAC MARKERS:                                  12.6   8.0   )-----------( 121      ( 09 Jan 2019 06:30 )             37.5     01-09    139  |  97  |  25<H>  ----------------------------<  116<H>  3.8   |  19<L>  |  1.39<H>    Ca    9.1      09 Jan 2019 06:30  Phos  2.5     01-08  Mg     2.0     01-09    TPro  6.4  /  Alb  3.8  /  TBili  0.8  /  DBili  x   /  AST  19  /  ALT  13  /  AlkPhos  75  01-09    proBNP:   Lipid Profile:   HgA1c: Hemoglobin A1C, Whole Blood: 5.2 % (01-09 @ 06:30)    TSH: Thyroid Stimulating Hormone, Serum: 1.203 uIU/mL (01-09 @ 06:30)    PT/INR - ( 08 Jan 2019 10:34 )   PT: 11.8 sec;   INR: 1.04          PTT - ( 08 Jan 2019 10:34 )  PTT:26.1 sec    ASSESSMENT/PLAN: 	        DVT ppx:  Dispo: Interventional Cardiology PA Adult Progress Note    Subjective Assessment: Pt was examined at bedside this AM. Feeling well. Denies CP, SOB, palpitations or dizziness.  12 point ROS negative except as per subjective   	  MEDICATIONS:  melatonin 3 milliGRAM(s) Oral at bedtime  ondansetron Injectable 4 milliGRAM(s) IV Push once  pantoprazole    Tablet 40 milliGRAM(s) Oral before breakfast  BACItracin   Ointment 1 Application(s) Topical three times a day      	    [PHYSICAL EXAM:  TELEMETRY:  T(C): 36.7 (01-09-19 @ 09:31), Max: 37.1 (01-08-19 @ 16:57)  HR: 56 (01-09-19 @ 09:31) (56 - 88)  BP: 115/74 (01-09-19 @ 10:56) (114/71 - 181/93)  RR: 16 (01-09-19 @ 09:31) (16 - 18)  SpO2: 96% (01-09-19 @ 09:31) (96% - 99%)    I&O's Summary    08 Jan 2019 07:01  -  09 Jan 2019 07:00  --------------------------------------------------------  IN: 0 mL / OUT: 1200 mL / NET: -1200 mL        Rodríguez: straight caths self   Central/PICC/Mid Line: No                                         Appearance: Normal	  HEENT:   linear scalp laceration 	  Neck: Supple,  - JVD  Cardiovascular: Normal S1 S2, No JVD, No murmurs,   Respiratory: Lungs clear to auscultation, No Rales, Rhonchi, Wheezing	  Gastrointestinal:  Soft, Non-tender, + BS	  Skin: No rashes, No ecchymoses, No cyanosis  Extremities: Normal range of motion, No clubbing, cyanosis or edema  Vascular: Peripheral pulses palpable 2+ bilaterally  Neurologic: Non-focal  Psychiatry: A & O x 3, Mood & affect appropriate      	    CXR revealed: Lungs clear with no infiltrate or pleural effusion.    CT head: Right parietal scalp soft tissue swelling with subcutaneous emphysema which may be secondary to underlying laceration injury, no ICH or calvarial fracture.    XR lumbar/pelvis spine: No fracture, mild levoscoliosis, lumbar spondylosis   	    LABS:	                         12.6   8.0   )-----------( 121      ( 09 Jan 2019 06:30 )             37.5     01-09    139  |  97  |  25<H>  ----------------------------<  116<H>  3.8   |  19<L>  |  1.39<H>    Ca    9.1      09 Jan 2019 06:30  Phos  2.5     01-08  Mg     2.0     01-09    TPro  6.4  /  Alb  3.8  /  TBili  0.8  /  DBili  x   /  AST  19  /  ALT  13  /  AlkPhos  75  01-09       HgA1c: Hemoglobin A1C, Whole Blood: 5.2 % (01-09 @ 06:30)    TSH: Thyroid Stimulating Hormone, Serum: 1.203 uIU/mL (01-09 @ 06:30)    PT/INR - ( 08 Jan 2019 10:34 )   PT: 11.8 sec;   INR: 1.04          PTT - ( 08 Jan 2019 10:34 )  PTT:26.1 sec

## 2019-01-09 NOTE — PHYSICAL THERAPY INITIAL EVALUATION ADULT - MODIFIED CLINICAL TEST OF SENSORY INTEGRATION IN BALANCE TEST
Pt able to toilet indpt, reach over for pants on ground without LOB, sink and dryer use without balance disturbance

## 2019-01-10 ENCOUNTER — TRANSCRIPTION ENCOUNTER (OUTPATIENT)
Age: 79
End: 2019-01-10

## 2019-01-10 PROBLEM — M19.90 UNSPECIFIED OSTEOARTHRITIS, UNSPECIFIED SITE: Chronic | Status: ACTIVE | Noted: 2019-01-08

## 2019-01-10 PROBLEM — N40.0 BENIGN PROSTATIC HYPERPLASIA WITHOUT LOWER URINARY TRACT SYMPTOMS: Chronic | Status: ACTIVE | Noted: 2019-01-08

## 2019-01-10 PROBLEM — E78.5 HYPERLIPIDEMIA, UNSPECIFIED: Chronic | Status: ACTIVE | Noted: 2019-01-08

## 2019-01-10 PROBLEM — C61 MALIGNANT NEOPLASM OF PROSTATE: Chronic | Status: ACTIVE | Noted: 2019-01-08

## 2019-01-10 PROBLEM — I10 ESSENTIAL (PRIMARY) HYPERTENSION: Chronic | Status: ACTIVE | Noted: 2019-01-08

## 2019-01-10 PROBLEM — N31.9 NEUROMUSCULAR DYSFUNCTION OF BLADDER, UNSPECIFIED: Chronic | Status: ACTIVE | Noted: 2019-01-08

## 2019-01-10 LAB
ANION GAP SERPL CALC-SCNC: 12 MMOL/L — SIGNIFICANT CHANGE UP (ref 5–17)
BUN SERPL-MCNC: 24 MG/DL — HIGH (ref 7–23)
CALCIUM SERPL-MCNC: 8.8 MG/DL — SIGNIFICANT CHANGE UP (ref 8.4–10.5)
CHLORIDE SERPL-SCNC: 102 MMOL/L — SIGNIFICANT CHANGE UP (ref 96–108)
CO2 SERPL-SCNC: 21 MMOL/L — LOW (ref 22–31)
CREAT SERPL-MCNC: 1.52 MG/DL — HIGH (ref 0.5–1.3)
CULTURE RESULTS: SIGNIFICANT CHANGE UP
GLUCOSE SERPL-MCNC: 108 MG/DL — HIGH (ref 70–99)
HCT VFR BLD CALC: 36.8 % — LOW (ref 39–50)
HGB BLD-MCNC: 12.4 G/DL — LOW (ref 13–17)
MAGNESIUM SERPL-MCNC: 2 MG/DL — SIGNIFICANT CHANGE UP (ref 1.6–2.6)
MCHC RBC-ENTMCNC: 29.1 PG — SIGNIFICANT CHANGE UP (ref 27–34)
MCHC RBC-ENTMCNC: 33.7 G/DL — SIGNIFICANT CHANGE UP (ref 32–36)
MCV RBC AUTO: 86.4 FL — SIGNIFICANT CHANGE UP (ref 80–100)
PLATELET # BLD AUTO: 133 K/UL — LOW (ref 150–400)
POTASSIUM SERPL-MCNC: 3.9 MMOL/L — SIGNIFICANT CHANGE UP (ref 3.5–5.3)
POTASSIUM SERPL-SCNC: 3.9 MMOL/L — SIGNIFICANT CHANGE UP (ref 3.5–5.3)
RBC # BLD: 4.26 M/UL — SIGNIFICANT CHANGE UP (ref 4.2–5.8)
RBC # FLD: 12.7 % — SIGNIFICANT CHANGE UP (ref 10.3–16.9)
SODIUM SERPL-SCNC: 135 MMOL/L — SIGNIFICANT CHANGE UP (ref 135–145)
SPECIMEN SOURCE: SIGNIFICANT CHANGE UP
WBC # BLD: 7.1 K/UL — SIGNIFICANT CHANGE UP (ref 3.8–10.5)
WBC # FLD AUTO: 7.1 K/UL — SIGNIFICANT CHANGE UP (ref 3.8–10.5)

## 2019-01-10 PROCEDURE — 93306 TTE W/DOPPLER COMPLETE: CPT | Mod: 26

## 2019-01-10 PROCEDURE — 99233 SBSQ HOSP IP/OBS HIGH 50: CPT

## 2019-01-10 RX ORDER — ACETAMINOPHEN 500 MG
650 TABLET ORAL ONCE
Qty: 0 | Refills: 0 | Status: COMPLETED | OUTPATIENT
Start: 2019-01-10 | End: 2019-01-10

## 2019-01-10 RX ORDER — AMLODIPINE BESYLATE 2.5 MG/1
1 TABLET ORAL
Qty: 0 | Refills: 0 | COMMUNITY

## 2019-01-10 RX ORDER — ATORVASTATIN CALCIUM 80 MG/1
1 TABLET, FILM COATED ORAL
Qty: 30 | Refills: 3 | OUTPATIENT
Start: 2019-01-10 | End: 2019-05-09

## 2019-01-10 RX ORDER — HEPARIN SODIUM 5000 [USP'U]/ML
5000 INJECTION INTRAVENOUS; SUBCUTANEOUS EVERY 8 HOURS
Qty: 0 | Refills: 0 | Status: DISCONTINUED | OUTPATIENT
Start: 2019-01-10 | End: 2019-01-11

## 2019-01-10 RX ORDER — ATORVASTATIN CALCIUM 80 MG/1
10 TABLET, FILM COATED ORAL AT BEDTIME
Qty: 0 | Refills: 0 | Status: DISCONTINUED | OUTPATIENT
Start: 2019-01-10 | End: 2019-01-11

## 2019-01-10 RX ORDER — SODIUM CHLORIDE 9 MG/ML
1000 INJECTION INTRAMUSCULAR; INTRAVENOUS; SUBCUTANEOUS
Qty: 0 | Refills: 0 | Status: DISCONTINUED | OUTPATIENT
Start: 2019-01-10 | End: 2019-01-11

## 2019-01-10 RX ORDER — ASPIRIN/CALCIUM CARB/MAGNESIUM 324 MG
81 TABLET ORAL DAILY
Qty: 0 | Refills: 0 | Status: DISCONTINUED | OUTPATIENT
Start: 2019-01-10 | End: 2019-01-11

## 2019-01-10 RX ORDER — HEPARIN SODIUM 5000 [USP'U]/ML
5000 INJECTION INTRAVENOUS; SUBCUTANEOUS EVERY 8 HOURS
Qty: 0 | Refills: 0 | Status: DISCONTINUED | OUTPATIENT
Start: 2019-01-10 | End: 2019-01-10

## 2019-01-10 RX ORDER — AMLODIPINE BESYLATE 2.5 MG/1
1 TABLET ORAL
Qty: 30 | Refills: 3 | OUTPATIENT
Start: 2019-01-10 | End: 2019-05-09

## 2019-01-10 RX ORDER — AMLODIPINE BESYLATE 2.5 MG/1
2.5 TABLET ORAL DAILY
Qty: 0 | Refills: 0 | Status: DISCONTINUED | OUTPATIENT
Start: 2019-01-10 | End: 2019-01-11

## 2019-01-10 RX ORDER — ATORVASTATIN CALCIUM 80 MG/1
1 TABLET, FILM COATED ORAL
Qty: 0 | Refills: 0 | COMMUNITY

## 2019-01-10 RX ADMIN — Medication 650 MILLIGRAM(S): at 19:05

## 2019-01-10 RX ADMIN — Medication 650 MILLIGRAM(S): at 18:37

## 2019-01-10 RX ADMIN — PANTOPRAZOLE SODIUM 40 MILLIGRAM(S): 20 TABLET, DELAYED RELEASE ORAL at 05:58

## 2019-01-10 RX ADMIN — Medication 1 APPLICATION(S): at 18:37

## 2019-01-10 RX ADMIN — Medication 1 APPLICATION(S): at 00:10

## 2019-01-10 RX ADMIN — HEPARIN SODIUM 5000 UNIT(S): 5000 INJECTION INTRAVENOUS; SUBCUTANEOUS at 22:13

## 2019-01-10 RX ADMIN — Medication 1 APPLICATION(S): at 22:13

## 2019-01-10 RX ADMIN — Medication 81 MILLIGRAM(S): at 12:25

## 2019-01-10 RX ADMIN — ATORVASTATIN CALCIUM 10 MILLIGRAM(S): 80 TABLET, FILM COATED ORAL at 22:13

## 2019-01-10 RX ADMIN — AMLODIPINE BESYLATE 2.5 MILLIGRAM(S): 2.5 TABLET ORAL at 12:25

## 2019-01-10 RX ADMIN — Medication 1 APPLICATION(S): at 09:51

## 2019-01-10 RX ADMIN — SODIUM CHLORIDE 75 MILLILITER(S): 9 INJECTION INTRAMUSCULAR; INTRAVENOUS; SUBCUTANEOUS at 12:25

## 2019-01-10 RX ADMIN — Medication 3 MILLIGRAM(S): at 00:09

## 2019-01-10 RX ADMIN — Medication 3 MILLIGRAM(S): at 22:13

## 2019-01-10 NOTE — DISCHARGE NOTE ADULT - CARE PROVIDERS DIRECT ADDRESSES
[email or web address]

## 2019-01-10 NOTE — DISCHARGE NOTE ADULT - ADDITIONAL INSTRUCTIONS
All of your medications have been prescribed to you please take as directed, do not stop unless directed by physician.  Please follow up with Dr. Ramirez in 1-2 weeks. Please return to the hospital/seek immediate medical attention if worsening of symptoms- including not limited to chest pain, shortness of breath

## 2019-01-10 NOTE — DISCHARGE NOTE ADULT - CARE PROVIDER_API CALL
Carmelo Ramirez), Cardiovascular Disease; Internal Medicine  Cardiology [place] [address] [place] [address]  Phone: [phone]  Fax: [phone] Carmelo Ramirez), Cardiovascular Disease; Internal Medicine  Cardiology [facility]  [address] [place] [address]  Phone: [phone]  Fax: [phone]    Salty Tristan), Cardiac Electrophysiology; Cardiology; Cardiovascular Disease  [address] [place] [address]  Phone: [phone]  Fax: [phone]

## 2019-01-10 NOTE — DISCHARGE NOTE ADULT - CARE PLAN
Principal Discharge DX:	Syncope Principal Discharge DX:	Vasovagal syncope  Goal:	You came in after fainting. You had an Echo and Ultrasound of your carotids which were both normal. Please follow up with outpatient cardiologist.  Assessment and plan of treatment:	Many nerves connect with your heart and blood vessels. These nerves help control the speed and force of your heartbeat. They also regulate blood pressure. They control whether your blood vessels should be more open or more closed. Usually these nerves work together so you always get enough blood to your brain. In certain cases, these nerves may give a wrong signal. This may cause your blood vessels to open wide. At the same time, your heartbeat slows down. Blood can start to pool in your legs, and not enough of it may reach the brain. If that happens, you may briefly lose consciousness. When you lie down or fall down, blood flow to the brain resumes and you regain consciousness.     -Make sure you drink adequate amount of fluids daily  -A cardiac monitor will be sent to your home, this monitor will assess for arrhythmias to determine if that may be a cause of your fainting episode. You will have an appointment with an electrophysiologist for follow up.  Secondary Diagnosis:	HTN (hypertension)  Goal:	You have a history of elevated blood pressure and you should continue your blood pressure medications as prescribed.  Assessment and plan of treatment:	-continue amlodipine 2.5mg orally daily  -monitor blood pressure daily with goal <140/90  Secondary Diagnosis:	Hyperlipidemia  Goal:	Please continue your daily statin  Assessment and plan of treatment:	-continue atorvastatin 10mg orally at bedtime Principal Discharge DX:	Vasovagal syncope  Goal:	You came in after fainting. You had an Echo and Ultrasound of your carotids which were both normal. Please follow up with outpatient cardiologist.  Assessment and plan of treatment:	Many nerves connect with your heart and blood vessels. These nerves help control the speed and force of your heartbeat. They also regulate blood pressure. They control whether your blood vessels should be more open or more closed. Usually these nerves work together so you always get enough blood to your brain. In certain cases, these nerves may give a wrong signal. This may cause your blood vessels to open wide. At the same time, your heartbeat slows down. Blood can start to pool in your legs, and not enough of it may reach the brain. If that happens, you may briefly lose consciousness. When you lie down or fall down, blood flow to the brain resumes and you regain consciousness.     -Make sure you drink adequate amount of fluids daily  -A cardiac monitor will be sent to your home, this monitor will assess for arrhythmias to determine if that may be a cause of your fainting episode. You will have an appointment with Dr. Tristan on 02/13/2019 at 11AM.  Secondary Diagnosis:	HTN (hypertension)  Goal:	You have a history of elevated blood pressure and you should continue your blood pressure medications as prescribed.  Assessment and plan of treatment:	-continue amlodipine 2.5mg orally daily  -monitor blood pressure daily with goal <140/90  Secondary Diagnosis:	Hyperlipidemia  Goal:	Please continue your daily statin  Assessment and plan of treatment:	-continue atorvastatin 10mg orally at bedtime

## 2019-01-10 NOTE — DISCHARGE NOTE ADULT - PLAN OF CARE
You came in after fainting. You had an Echo and Ultrasound of your carotids which were both normal. Please follow up with outpatient cardiologist. Many nerves connect with your heart and blood vessels. These nerves help control the speed and force of your heartbeat. They also regulate blood pressure. They control whether your blood vessels should be more open or more closed. Usually these nerves work together so you always get enough blood to your brain. In certain cases, these nerves may give a wrong signal. This may cause your blood vessels to open wide. At the same time, your heartbeat slows down. Blood can start to pool in your legs, and not enough of it may reach the brain. If that happens, you may briefly lose consciousness. When you lie down or fall down, blood flow to the brain resumes and you regain consciousness.     -Make sure you drink adequate amount of fluids daily  -A cardiac monitor will be sent to your home, this monitor will assess for arrhythmias to determine if that may be a cause of your fainting episode. You will have an appointment with an electrophysiologist for follow up. You have a history of elevated blood pressure and you should continue your blood pressure medications as prescribed. -continue amlodipine 2.5mg orally daily  -monitor blood pressure daily with goal <140/90 Please continue your daily statin -continue atorvastatin 10mg orally at bedtime Many nerves connect with your heart and blood vessels. These nerves help control the speed and force of your heartbeat. They also regulate blood pressure. They control whether your blood vessels should be more open or more closed. Usually these nerves work together so you always get enough blood to your brain. In certain cases, these nerves may give a wrong signal. This may cause your blood vessels to open wide. At the same time, your heartbeat slows down. Blood can start to pool in your legs, and not enough of it may reach the brain. If that happens, you may briefly lose consciousness. When you lie down or fall down, blood flow to the brain resumes and you regain consciousness.     -Make sure you drink adequate amount of fluids daily  -A cardiac monitor will be sent to your home, this monitor will assess for arrhythmias to determine if that may be a cause of your fainting episode. You will have an appointment with Dr. Tristan on 02/13/2019 at 11AM.

## 2019-01-10 NOTE — DISCHARGE NOTE ADULT - PATIENT PORTAL LINK FT
You can access the [facility], offered by [facility], by registering with the following website: [email or web address]

## 2019-01-10 NOTE — DISCHARGE NOTE ADULT - MEDICATION SUMMARY - MEDICATIONS TO TAKE
I will START or STAY ON the medications listed below when I get home from the hospital:    Aspir 81 oral delayed release tablet  -- 1 tab(s) by mouth once a day  -- Indication: For Prevention    atorvastatin 10 mg oral tablet  -- 1 tab(s) by mouth once a day  -- Indication: For Hyperlipidemia, unspecified hyperlipidemia type    amLODIPine 2.5 mg oral tablet  -- 1 tab(s) by mouth once a day  -- Indication: For HTN (hypertension)    Protonix 40 mg oral delayed release tablet  -- 1 tab(s) by mouth once a day  -- Indication: For Acid reflux    B Complex 50 oral tablet, extended release  -- 1 tab(s) by mouth once a day  -- Indication: For Supplement    Vitamin D2 400 intl units oral tablet  -- 1 tab(s) by mouth once a day  -- Indication: For Supplement    Vitamin C 250 mg oral tablet  -- 3 tab(s) by mouth once a day  -- Indication: For Supplement

## 2019-01-10 NOTE — DISCHARGE NOTE ADULT - HOSPITAL COURSE
77 yo male with PMHx of HTN, HLD, CKD stage III, neurogenic bladder (dx in 2017, self catheterizes 8AM, 4PM, 12 AM), prostate CA (dx in 1999, s/p XRT and currently undergoing monthly Lupron injections), BPH s/p TURP in 1999, GERD who presented to [facility] ED with unwitnessed syncopal episode. Pt states he was making oatmeal, felt lightheaded/dizzy with generalized weakness and then woke up on the ground. Pt endorses head strike and decreased hearing in B/L ears after event (L>R) which has since resolved.  Of note, pt endorses feeling of generalized weakness and intermittent dizziness when he lifts his arms over his head over past 2 years. Pt also reports increased stress recently as his girlfriend is on hospice care and his friend was recently dx with Parkinson's and kidney failure. Denies HA currently, vision changes, numbness/tingling, amaurosis fugax, prior CVA/TIA, extremity weakness, gait disturbance, urinary/fecal incontinence, CP, SOB, palpitations, diaphoresis, fatigue, LE edema, orthopnea, PND, prior syncopal episodes, N/V, abdominal pain.  Upon admit, VS: /93, HR 66 BPM, T: 98F, RR 18, SpO2 100% on RA.  Labs significant for BUN 29, Cr 1.64, H/H 12.9/38, neutrophils 78.8%, trop neg x1, UA: nitrite present, trace LE, bacteria present, epithelial cells present. ECG: SB @ 58 BPM with 1st degree AV block without ST/T segment changes. CXR revealed: Lungs clear with no infiltrate or pleural effusion. XR pelvis: Surgical clips lower pelvis, no bony abnormality, no fracture. XR lumbar spine: No fracture, mild levoscoliosis, lumbar spondylosis as described above. CT head: Right parietal scalp soft tissue swelling with subcutaneous emphysema which may be secondary to underlying laceration injury, no ICH or calvarial fracture. While in ED, pt received dtap vaccine. Pt admitted to 5U for telemetry, ECHO, ?neuro consult.   Trops negative x3    Carotid US 01/08: no carotid stenosis, anterograde flow within both vertebral arteries  Echo 01/09: EF 64%, trace AR    EP consulted for long term cardiac monitor which will be mailed to patient, and will f/u as an outpatient     Overnight, no events. This AM, feels well, denies CP, SOB, n/v/d, LE edema. VSS, no events on tele, labs Cr. 1.52 given IVF prior to discharge. PE sig for scalp laceration healing. Patient was seen and examined by attending who agrees with above d/c plan. All meds rx to pharmacy and explained to patient. Patient instructed to return if sx worsen including not limited to chest pain, shortness of breath. 77 yo male with PMHx of HTN, HLD, CKD stage III, neurogenic bladder (dx in 2017, self catheterizes 8AM, 4PM, 12 AM), prostate CA (dx in 1999, s/p XRT and currently undergoing monthly Lupron injections), BPH s/p TURP in 1999, GERD who presented to [facility] ED with unwitnessed syncopal episode. Pt states he was making oatmeal, felt lightheaded/dizzy with generalized weakness and then woke up on the ground. Pt endorses head strike and decreased hearing in B/L ears after event (L>R) which has since resolved.  Of note, pt endorses feeling of generalized weakness and intermittent dizziness when he lifts his arms over his head over past 2 years. Pt also reports increased stress recently as his girlfriend is on hospice care and his friend was recently dx with Parkinson's and kidney failure. Denies HA currently, vision changes, numbness/tingling, amaurosis fugax, prior CVA/TIA, extremity weakness, gait disturbance, urinary/fecal incontinence, CP, SOB, palpitations, diaphoresis, fatigue, LE edema, orthopnea, PND, prior syncopal episodes, N/V, abdominal pain.  Upon admit, VS: /93, HR 66 BPM, T: 98F, RR 18, SpO2 100% on RA.  Labs significant for BUN 29, Cr 1.64, H/H 12.9/38, neutrophils 78.8%, trop neg x1, UA: nitrite present, trace LE, bacteria present, epithelial cells present. ECG: SB @ 58 BPM with 1st degree AV block without ST/T segment changes. CXR revealed: Lungs clear with no infiltrate or pleural effusion. XR pelvis: Surgical clips lower pelvis, no bony abnormality, no fracture. XR lumbar spine: No fracture, mild levoscoliosis, lumbar spondylosis as described above. CT head: Right parietal scalp soft tissue swelling with subcutaneous emphysema which may be secondary to underlying laceration injury, no ICH or calvarial fracture. While in ED, pt received dtap vaccine. Pt admitted to 5U for telemetry, ECHO, ?neuro consult.       Trops negative [id number] US 01/08: no carotid stenosis, anterograde flow within both vertebral arteries. Echo 01/09: EF 64%, trace AR. No plan for ishcemic w/u this admission as per Dr. Sprague.  EP consulted for long term cardiac monitor which will be mailed to patient, and will f/u as an outpatient     Cr noted to trend from 1.64 --> 1.34 --> 1.52 --> 1.75 during hospital admit. As per pt, he has known CKD and his baseline is around 1.5-1.7. Pt received IV NS bolus 250 cc x2 prior to discharge as pt appeared dry and as  EF WNL. Pt to follow up with Dr. Joe as outpatient to monitor and recheck creatinine as outpatient.     Overnight, no events. This AM, feels well, denies CP, SOB, n/v/d, LE edema. VSS, no events on tele, labs Cr. 1.74. given IVF prior to discharge. PE sig for scalp laceration healing. Patient was seen and examined by attending who agrees with above d/c plan. All meds rx to pharmacy and explained to patient. Patient instructed to return if sx worsen including not limited to chest pain, shortness of breath.    Temp  97.8F, HR 57 BPM,  /65, RR 17, SpO2 98% on RA  Gen: NAD, A&O x3  Cards: RRR, clear S1 and S2 without murmur  Pulm: CTA B/L without w/r/r  Vascular: DP/PT 1+ B/L, radial 2+ B/L  Abd: soft, NT, BS + x4  Ext: no LE edema or ulcerations B/L

## 2019-01-11 VITALS — TEMPERATURE: 98 F

## 2019-01-11 PROBLEM — Z00.00 ENCOUNTER FOR PREVENTIVE HEALTH EXAMINATION: Status: ACTIVE | Noted: 2019-01-11

## 2019-01-11 LAB
ANION GAP SERPL CALC-SCNC: 16 MMOL/L — SIGNIFICANT CHANGE UP (ref 5–17)
BUN SERPL-MCNC: 31 MG/DL — HIGH (ref 7–23)
CALCIUM SERPL-MCNC: 8.9 MG/DL — SIGNIFICANT CHANGE UP (ref 8.4–10.5)
CHLORIDE SERPL-SCNC: 102 MMOL/L — SIGNIFICANT CHANGE UP (ref 96–108)
CO2 SERPL-SCNC: 22 MMOL/L — SIGNIFICANT CHANGE UP (ref 22–31)
CREAT SERPL-MCNC: 1.74 MG/DL — HIGH (ref 0.5–1.3)
GLUCOSE SERPL-MCNC: 99 MG/DL — SIGNIFICANT CHANGE UP (ref 70–99)
HCT VFR BLD CALC: 38.5 % — LOW (ref 39–50)
HGB BLD-MCNC: 12.8 G/DL — LOW (ref 13–17)
MAGNESIUM SERPL-MCNC: 2.1 MG/DL — SIGNIFICANT CHANGE UP (ref 1.6–2.6)
MCHC RBC-ENTMCNC: 29.1 PG — SIGNIFICANT CHANGE UP (ref 27–34)
MCHC RBC-ENTMCNC: 33.2 G/DL — SIGNIFICANT CHANGE UP (ref 32–36)
MCV RBC AUTO: 87.5 FL — SIGNIFICANT CHANGE UP (ref 80–100)
PLATELET # BLD AUTO: 132 K/UL — LOW (ref 150–400)
POTASSIUM SERPL-MCNC: 4.4 MMOL/L — SIGNIFICANT CHANGE UP (ref 3.5–5.3)
POTASSIUM SERPL-SCNC: 4.4 MMOL/L — SIGNIFICANT CHANGE UP (ref 3.5–5.3)
RBC # BLD: 4.4 M/UL — SIGNIFICANT CHANGE UP (ref 4.2–5.8)
RBC # FLD: 12.9 % — SIGNIFICANT CHANGE UP (ref 10.3–16.9)
SODIUM SERPL-SCNC: 140 MMOL/L — SIGNIFICANT CHANGE UP (ref 135–145)
WBC # BLD: 7 K/UL — SIGNIFICANT CHANGE UP (ref 3.8–10.5)
WBC # FLD AUTO: 7 K/UL — SIGNIFICANT CHANGE UP (ref 3.8–10.5)

## 2019-01-11 PROCEDURE — 80061 LIPID PANEL: CPT

## 2019-01-11 PROCEDURE — 82553 CREATINE MB FRACTION: CPT

## 2019-01-11 PROCEDURE — 83036 HEMOGLOBIN GLYCOSYLATED A1C: CPT

## 2019-01-11 PROCEDURE — 97161 PT EVAL LOW COMPLEX 20 MIN: CPT

## 2019-01-11 PROCEDURE — 86780 TREPONEMA PALLIDUM: CPT

## 2019-01-11 PROCEDURE — 85027 COMPLETE CBC AUTOMATED: CPT

## 2019-01-11 PROCEDURE — 71045 X-RAY EXAM CHEST 1 VIEW: CPT

## 2019-01-11 PROCEDURE — 85730 THROMBOPLASTIN TIME PARTIAL: CPT

## 2019-01-11 PROCEDURE — 99285 EMERGENCY DEPT VISIT HI MDM: CPT | Mod: 25

## 2019-01-11 PROCEDURE — 90715 TDAP VACCINE 7 YRS/> IM: CPT

## 2019-01-11 PROCEDURE — 72170 X-RAY EXAM OF PELVIS: CPT

## 2019-01-11 PROCEDURE — 93880 EXTRACRANIAL BILAT STUDY: CPT

## 2019-01-11 PROCEDURE — 80048 BASIC METABOLIC PNL TOTAL CA: CPT

## 2019-01-11 PROCEDURE — 85610 PROTHROMBIN TIME: CPT

## 2019-01-11 PROCEDURE — 84443 ASSAY THYROID STIM HORMONE: CPT

## 2019-01-11 PROCEDURE — 70450 CT HEAD/BRAIN W/O DYE: CPT

## 2019-01-11 PROCEDURE — 81001 URINALYSIS AUTO W/SCOPE: CPT

## 2019-01-11 PROCEDURE — 80074 ACUTE HEPATITIS PANEL: CPT

## 2019-01-11 PROCEDURE — 99239 HOSP IP/OBS DSCHRG MGMT >30: CPT

## 2019-01-11 PROCEDURE — 36415 COLL VENOUS BLD VENIPUNCTURE: CPT

## 2019-01-11 PROCEDURE — 82550 ASSAY OF CK (CPK): CPT

## 2019-01-11 PROCEDURE — 83735 ASSAY OF MAGNESIUM: CPT

## 2019-01-11 PROCEDURE — 84100 ASSAY OF PHOSPHORUS: CPT

## 2019-01-11 PROCEDURE — 72100 X-RAY EXAM L-S SPINE 2/3 VWS: CPT

## 2019-01-11 PROCEDURE — 93306 TTE W/DOPPLER COMPLETE: CPT

## 2019-01-11 PROCEDURE — 87086 URINE CULTURE/COLONY COUNT: CPT

## 2019-01-11 PROCEDURE — 85025 COMPLETE CBC W/AUTO DIFF WBC: CPT

## 2019-01-11 PROCEDURE — 82607 VITAMIN B-12: CPT

## 2019-01-11 PROCEDURE — 87389 HIV-1 AG W/HIV-1&-2 AB AG IA: CPT

## 2019-01-11 PROCEDURE — 72220 X-RAY EXAM SACRUM TAILBONE: CPT

## 2019-01-11 PROCEDURE — 80053 COMPREHEN METABOLIC PANEL: CPT

## 2019-01-11 PROCEDURE — 82746 ASSAY OF FOLIC ACID SERUM: CPT

## 2019-01-11 PROCEDURE — 90471 IMMUNIZATION ADMIN: CPT

## 2019-01-11 PROCEDURE — 84484 ASSAY OF TROPONIN QUANT: CPT

## 2019-01-11 PROCEDURE — 93005 ELECTROCARDIOGRAM TRACING: CPT

## 2019-01-11 RX ORDER — SODIUM CHLORIDE 9 MG/ML
250 INJECTION INTRAMUSCULAR; INTRAVENOUS; SUBCUTANEOUS ONCE
Qty: 0 | Refills: 0 | Status: COMPLETED | OUTPATIENT
Start: 2019-01-11 | End: 2019-01-11

## 2019-01-11 RX ADMIN — Medication 1 APPLICATION(S): at 06:33

## 2019-01-11 RX ADMIN — SODIUM CHLORIDE 500 MILLILITER(S): 9 INJECTION INTRAMUSCULAR; INTRAVENOUS; SUBCUTANEOUS at 13:29

## 2019-01-11 RX ADMIN — AMLODIPINE BESYLATE 2.5 MILLIGRAM(S): 2.5 TABLET ORAL at 06:34

## 2019-01-11 RX ADMIN — Medication 1 APPLICATION(S): at 13:29

## 2019-01-11 RX ADMIN — Medication 81 MILLIGRAM(S): at 12:39

## 2019-01-11 RX ADMIN — HEPARIN SODIUM 5000 UNIT(S): 5000 INJECTION INTRAVENOUS; SUBCUTANEOUS at 06:34

## 2019-01-11 RX ADMIN — HEPARIN SODIUM 5000 UNIT(S): 5000 INJECTION INTRAVENOUS; SUBCUTANEOUS at 13:28

## 2019-01-11 RX ADMIN — PANTOPRAZOLE SODIUM 40 MILLIGRAM(S): 20 TABLET, DELAYED RELEASE ORAL at 06:34

## 2019-01-11 RX ADMIN — SODIUM CHLORIDE 500 MILLILITER(S): 9 INJECTION INTRAMUSCULAR; INTRAVENOUS; SUBCUTANEOUS at 12:39

## 2019-01-15 DIAGNOSIS — Z23 ENCOUNTER FOR IMMUNIZATION: ICD-10-CM
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
## 2019-01-15 DIAGNOSIS — N18.3 CHRONIC KIDNEY DISEASE, STAGE 3 (MODERATE): ICD-10-CM
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
## 2019-01-15 DIAGNOSIS — M19.90 UNSPECIFIED OSTEOARTHRITIS, UNSPECIFIED SITE: ICD-10-CM

## 2019-01-15 DIAGNOSIS — Z90.79 ACQUIRED ABSENCE OF OTHER GENITAL ORGAN(S): ICD-10-CM

## 2019-01-15 DIAGNOSIS — R55 SYNCOPE AND COLLAPSE: ICD-10-CM

## 2019-01-15 DIAGNOSIS — I12.9 HYPERTENSIVE CHRONIC KIDNEY DISEASE WITH STAGE 1 THROUGH STAGE 4 CHRONIC KIDNEY DISEASE, OR UNSPECIFIED CHRONIC KIDNEY DISEASE: ICD-10-CM

## 2019-01-15 DIAGNOSIS — K21.9 GASTRO-ESOPHAGEAL REFLUX DISEASE WITHOUT ESOPHAGITIS: ICD-10-CM

## 2019-01-15 DIAGNOSIS — I44.0 ATRIOVENTRICULAR BLOCK, FIRST DEGREE: ICD-10-CM

## 2019-01-15 DIAGNOSIS — S01.01XA LACERATION WITHOUT FOREIGN BODY OF SCALP, INITIAL ENCOUNTER: ICD-10-CM

## 2019-01-15 DIAGNOSIS — N31.9 NEUROMUSCULAR DYSFUNCTION OF BLADDER, UNSPECIFIED: ICD-10-CM

## 2019-01-15 DIAGNOSIS — E78.5 HYPERLIPIDEMIA, UNSPECIFIED: ICD-10-CM

## 2019-02-13 ENCOUNTER — APPOINTMENT (OUTPATIENT)
Dept: HEART AND VASCULAR | Facility: CLINIC | Age: 79
End: 2019-02-13

## 2019-03-13 ENCOUNTER — OTHER (OUTPATIENT)
Age: 79
End: 2019-03-13

## 2019-07-24 NOTE — H&P ADULT - PROBLEM SELECTOR PROBLEM 4
Hyperlipidemia, unspecified hyperlipidemia type Paramedian Forehead Flap Text: A decision was made to reconstruct the defect utilizing an interpolation axial flap and a staged reconstruction.  A telfa template was made of the defect.  This telfa template was then used to outline the paramedian forehead pedicle flap.  The donor area for the pedicle flap was then injected with anesthesia.  The flap was excised through the skin and subcutaneous tissue down to the layer of the underlying musculature.  The pedicle flap was carefully excised within this deep plane to maintain its blood supply.  The edges of the donor site were undermined.   The donor site was closed in a primary fashion.  The pedicle was then rotated into position and sutured.  Once the tube was sutured into place, adequate blood supply was confirmed with blanching and refill.  The pedicle was then wrapped with xeroform gauze and dressed appropriately with a telfa and gauze bandage to ensure continued blood supply and protect the attached pedicle.

## 2024-12-16 ENCOUNTER — NON-APPOINTMENT (OUTPATIENT)
Age: 84
End: 2024-12-16

## 2024-12-16 DIAGNOSIS — L84 CORNS AND CALLOSITIES: ICD-10-CM

## 2024-12-16 DIAGNOSIS — Z87.39 PERSONAL HISTORY OF OTHER DISEASES OF THE MUSCULOSKELETAL SYSTEM AND CONNECTIVE TISSUE: ICD-10-CM

## 2024-12-16 DIAGNOSIS — Z85.9 PERSONAL HISTORY OF MALIGNANT NEOPLASM, UNSPECIFIED: ICD-10-CM

## 2024-12-16 DIAGNOSIS — Z78.9 OTHER SPECIFIED HEALTH STATUS: ICD-10-CM

## 2024-12-16 DIAGNOSIS — Z86.59 PERSONAL HISTORY OF OTHER MENTAL AND BEHAVIORAL DISORDERS: ICD-10-CM

## 2024-12-16 DIAGNOSIS — B35.1 TINEA UNGUIUM: ICD-10-CM

## 2024-12-16 DIAGNOSIS — Z83.518 FAMILY HISTORY OF OTHER SPECIFIED EYE DISORDER: ICD-10-CM

## 2024-12-16 DIAGNOSIS — Z86.79 PERSONAL HISTORY OF OTHER DISEASES OF THE CIRCULATORY SYSTEM: ICD-10-CM

## 2024-12-16 DIAGNOSIS — Z86.39 PERSONAL HISTORY OF OTHER ENDOCRINE, NUTRITIONAL AND METABOLIC DISEASE: ICD-10-CM

## 2024-12-16 DIAGNOSIS — Z82.61 FAMILY HISTORY OF ARTHRITIS: ICD-10-CM

## 2024-12-16 DIAGNOSIS — Z82.5 FAMILY HISTORY OF ASTHMA AND OTHER CHRONIC LOWER RESPIRATORY DISEASES: ICD-10-CM

## 2024-12-16 DIAGNOSIS — Z86.69 PERSONAL HISTORY OF OTHER DISEASES OF THE NERVOUS SYSTEM AND SENSE ORGANS: ICD-10-CM

## 2024-12-16 DIAGNOSIS — Z87.19 PERSONAL HISTORY OF OTHER DISEASES OF THE DIGESTIVE SYSTEM: ICD-10-CM

## 2024-12-16 RX ORDER — FAMOTIDINE 40 MG/1
40 TABLET, FILM COATED ORAL
Refills: 0 | Status: ACTIVE | COMMUNITY

## 2024-12-16 RX ORDER — AMLODIPINE BESYLATE 5 MG/1
5 TABLET ORAL
Refills: 0 | Status: ACTIVE | COMMUNITY

## 2024-12-16 RX ORDER — PANTOPRAZOLE 40 MG/1
40 TABLET, DELAYED RELEASE ORAL
Refills: 0 | Status: ACTIVE | COMMUNITY

## 2024-12-16 RX ORDER — ATORVASTATIN CALCIUM 10 MG/1
10 TABLET, FILM COATED ORAL
Refills: 0 | Status: ACTIVE | COMMUNITY

## 2024-12-16 RX ORDER — TAMSULOSIN HYDROCHLORIDE 0.4 MG/1
0.4 CAPSULE ORAL
Refills: 0 | Status: ACTIVE | COMMUNITY

## 2024-12-16 RX ORDER — CLONAZEPAM 0.5 MG/1
0.5 TABLET ORAL
Refills: 0 | Status: ACTIVE | COMMUNITY

## 2024-12-16 RX ORDER — ACETAMINOPHEN 325 MG/1
325 TABLET ORAL
Refills: 0 | Status: ACTIVE | COMMUNITY

## 2024-12-16 RX ORDER — DEXLANSOPRAZOLE 60 MG/1
60 CAPSULE, DELAYED RELEASE ORAL
Refills: 0 | Status: ACTIVE | COMMUNITY

## 2024-12-16 RX ORDER — LEUPROLIDE ACETATE 1 MG/0.2ML
5 VIAL (ML) SUBCUTANEOUS
Refills: 0 | Status: ACTIVE | COMMUNITY